# Patient Record
Sex: FEMALE | Race: WHITE
[De-identification: names, ages, dates, MRNs, and addresses within clinical notes are randomized per-mention and may not be internally consistent; named-entity substitution may affect disease eponyms.]

---

## 2020-10-23 LAB
ALBUMIN SERPL BCP-MCNC: 4.1 G/DL (ref 3.4–5)
ALBUMIN/GLOB SERPL: 1.1 {RATIO} (ref 1.1–1.5)
ALP SERPL-CCNC: 69 IU/L (ref 46–116)
ALT SERPL W P-5'-P-CCNC: 30 U/L (ref 30–65)
ANION GAP SERPL CALCULATED.3IONS-SCNC: 10 MMOL/L (ref 8–16)
APTT PPP: 27 SECONDS (ref 22–32)
AST SERPL W P-5'-P-CCNC: 30 U/L (ref 10–37)
BASOPHILS # BLD AUTO: 0.1 X10'3 (ref 0–0.2)
BASOPHILS NFR BLD AUTO: 1.8 % (ref 0–1)
BILIRUB SERPL-MCNC: 0.5 MG/DL (ref 0–1)
BUN SERPL-MCNC: 24 MG/DL (ref 7–18)
BUN/CREAT SERPL: 21.6 (ref 6.6–38)
CALCIUM SERPL-MCNC: 10.9 MG/DL (ref 8.5–10.1)
CHLORIDE SERPL-SCNC: 105 MMOL/L (ref 99–107)
CO2 SERPL-SCNC: 28 MMOL/L (ref 24–32)
CREAT SERPL-MCNC: 1.11 MG/DL (ref 0.4–0.9)
EOSINOPHIL # BLD AUTO: 0.2 X10'3 (ref 0–0.9)
EOSINOPHIL NFR BLD AUTO: 3.8 % (ref 0–6)
ERYTHROCYTE [DISTWIDTH] IN BLOOD BY AUTOMATED COUNT: 13.4 % (ref 11.5–14.5)
GFR SERPL CREATININE-BSD FRML MDRD: 48 ML/MIN
GLUCOSE SERPL-MCNC: 102 MG/DL (ref 70–104)
HCT VFR BLD AUTO: 40.5 % (ref 35–45)
HGB BLD-MCNC: 13.3 G/DL (ref 12–16)
INR PPP: 1 INR
LYMPHOCYTES # BLD AUTO: 1 X10'3 (ref 1.1–4.8)
LYMPHOCYTES NFR BLD AUTO: 20.1 % (ref 21–51)
MCH RBC QN AUTO: 29 PG (ref 27–31)
MCHC RBC AUTO-ENTMCNC: 32.9 G/DL (ref 33–36.5)
MCV RBC AUTO: 88.1 FL (ref 78–98)
MONOCYTES # BLD AUTO: 0.4 X10'3 (ref 0–0.9)
MONOCYTES NFR BLD AUTO: 8.9 % (ref 2–12)
NEUTROPHILS # BLD AUTO: 3.2 X10'3 (ref 1.8–7.7)
NEUTROPHILS NFR BLD AUTO: 65.4 % (ref 42–75)
PLATELET # BLD AUTO: 227 X10'3 (ref 140–440)
PMV BLD AUTO: 8 FL (ref 7.4–10.4)
POTASSIUM SERPL-SCNC: 3.7 MMOL/L (ref 3.4–5.1)
PROT SERPL-MCNC: 7.8 G/DL (ref 6.4–8.2)
PROTHROMBIN TIME: 10.3 SECONDS (ref 9–12)
RBC # BLD AUTO: 4.6 X10'6 (ref 4.2–5.6)
SODIUM SERPL-SCNC: 143 MMOL/L (ref 135–145)

## 2020-11-05 ENCOUNTER — HOSPITAL ENCOUNTER (OUTPATIENT)
Dept: HOSPITAL 94 - PAS | Age: 80
LOS: 3 days | Discharge: HOME | End: 2020-11-08
Attending: ORTHOPAEDIC SURGERY
Payer: MEDICARE

## 2020-11-05 VITALS — DIASTOLIC BLOOD PRESSURE: 45 MMHG | SYSTOLIC BLOOD PRESSURE: 74 MMHG

## 2020-11-05 VITALS — SYSTOLIC BLOOD PRESSURE: 89 MMHG | DIASTOLIC BLOOD PRESSURE: 44 MMHG

## 2020-11-05 VITALS — DIASTOLIC BLOOD PRESSURE: 42 MMHG | SYSTOLIC BLOOD PRESSURE: 85 MMHG

## 2020-11-05 VITALS — SYSTOLIC BLOOD PRESSURE: 84 MMHG | DIASTOLIC BLOOD PRESSURE: 44 MMHG

## 2020-11-05 VITALS — SYSTOLIC BLOOD PRESSURE: 85 MMHG | DIASTOLIC BLOOD PRESSURE: 48 MMHG

## 2020-11-05 VITALS — SYSTOLIC BLOOD PRESSURE: 81 MMHG | DIASTOLIC BLOOD PRESSURE: 50 MMHG

## 2020-11-05 VITALS — DIASTOLIC BLOOD PRESSURE: 48 MMHG | SYSTOLIC BLOOD PRESSURE: 78 MMHG

## 2020-11-05 VITALS — WEIGHT: 127.65 LBS | BODY MASS INDEX: 25.06 KG/M2 | HEIGHT: 60 IN

## 2020-11-05 VITALS — SYSTOLIC BLOOD PRESSURE: 95 MMHG | DIASTOLIC BLOOD PRESSURE: 48 MMHG

## 2020-11-05 VITALS — SYSTOLIC BLOOD PRESSURE: 90 MMHG | DIASTOLIC BLOOD PRESSURE: 48 MMHG

## 2020-11-05 VITALS — DIASTOLIC BLOOD PRESSURE: 51 MMHG | SYSTOLIC BLOOD PRESSURE: 94 MMHG

## 2020-11-05 VITALS — SYSTOLIC BLOOD PRESSURE: 92 MMHG | DIASTOLIC BLOOD PRESSURE: 42 MMHG

## 2020-11-05 VITALS — DIASTOLIC BLOOD PRESSURE: 49 MMHG | SYSTOLIC BLOOD PRESSURE: 92 MMHG

## 2020-11-05 VITALS — DIASTOLIC BLOOD PRESSURE: 50 MMHG | SYSTOLIC BLOOD PRESSURE: 97 MMHG

## 2020-11-05 VITALS — SYSTOLIC BLOOD PRESSURE: 92 MMHG | DIASTOLIC BLOOD PRESSURE: 49 MMHG

## 2020-11-05 VITALS — SYSTOLIC BLOOD PRESSURE: 145 MMHG | DIASTOLIC BLOOD PRESSURE: 84 MMHG

## 2020-11-05 VITALS — SYSTOLIC BLOOD PRESSURE: 85 MMHG | DIASTOLIC BLOOD PRESSURE: 45 MMHG

## 2020-11-05 VITALS — SYSTOLIC BLOOD PRESSURE: 107 MMHG | DIASTOLIC BLOOD PRESSURE: 58 MMHG

## 2020-11-05 VITALS — SYSTOLIC BLOOD PRESSURE: 76 MMHG | DIASTOLIC BLOOD PRESSURE: 47 MMHG

## 2020-11-05 VITALS — DIASTOLIC BLOOD PRESSURE: 44 MMHG | SYSTOLIC BLOOD PRESSURE: 87 MMHG

## 2020-11-05 VITALS — SYSTOLIC BLOOD PRESSURE: 89 MMHG | DIASTOLIC BLOOD PRESSURE: 49 MMHG

## 2020-11-05 VITALS — SYSTOLIC BLOOD PRESSURE: 84 MMHG | DIASTOLIC BLOOD PRESSURE: 43 MMHG

## 2020-11-05 VITALS — SYSTOLIC BLOOD PRESSURE: 80 MMHG | DIASTOLIC BLOOD PRESSURE: 40 MMHG

## 2020-11-05 VITALS — SYSTOLIC BLOOD PRESSURE: 101 MMHG | DIASTOLIC BLOOD PRESSURE: 46 MMHG

## 2020-11-05 VITALS — SYSTOLIC BLOOD PRESSURE: 76 MMHG | DIASTOLIC BLOOD PRESSURE: 40 MMHG

## 2020-11-05 VITALS — DIASTOLIC BLOOD PRESSURE: 52 MMHG | SYSTOLIC BLOOD PRESSURE: 88 MMHG

## 2020-11-05 VITALS — SYSTOLIC BLOOD PRESSURE: 101 MMHG | DIASTOLIC BLOOD PRESSURE: 51 MMHG

## 2020-11-05 VITALS — SYSTOLIC BLOOD PRESSURE: 90 MMHG | DIASTOLIC BLOOD PRESSURE: 51 MMHG

## 2020-11-05 VITALS — SYSTOLIC BLOOD PRESSURE: 96 MMHG | DIASTOLIC BLOOD PRESSURE: 53 MMHG

## 2020-11-05 VITALS — DIASTOLIC BLOOD PRESSURE: 53 MMHG | SYSTOLIC BLOOD PRESSURE: 101 MMHG

## 2020-11-05 VITALS — DIASTOLIC BLOOD PRESSURE: 43 MMHG | SYSTOLIC BLOOD PRESSURE: 72 MMHG

## 2020-11-05 VITALS — DIASTOLIC BLOOD PRESSURE: 49 MMHG | SYSTOLIC BLOOD PRESSURE: 89 MMHG

## 2020-11-05 VITALS — DIASTOLIC BLOOD PRESSURE: 42 MMHG | SYSTOLIC BLOOD PRESSURE: 92 MMHG

## 2020-11-05 VITALS — SYSTOLIC BLOOD PRESSURE: 85 MMHG | DIASTOLIC BLOOD PRESSURE: 46 MMHG

## 2020-11-05 VITALS — DIASTOLIC BLOOD PRESSURE: 84 MMHG | SYSTOLIC BLOOD PRESSURE: 145 MMHG

## 2020-11-05 VITALS — SYSTOLIC BLOOD PRESSURE: 79 MMHG | DIASTOLIC BLOOD PRESSURE: 43 MMHG

## 2020-11-05 VITALS — SYSTOLIC BLOOD PRESSURE: 82 MMHG | DIASTOLIC BLOOD PRESSURE: 47 MMHG

## 2020-11-05 DIAGNOSIS — Z79.899: ICD-10-CM

## 2020-11-05 DIAGNOSIS — M17.11: Primary | ICD-10-CM

## 2020-11-05 DIAGNOSIS — Z79.01: ICD-10-CM

## 2020-11-05 DIAGNOSIS — Z91.013: ICD-10-CM

## 2020-11-05 DIAGNOSIS — Z20.828: ICD-10-CM

## 2020-11-05 DIAGNOSIS — I10: ICD-10-CM

## 2020-11-05 DIAGNOSIS — G89.18: ICD-10-CM

## 2020-11-05 DIAGNOSIS — E03.9: ICD-10-CM

## 2020-11-05 LAB
ANION GAP BLD CALC-SCNC: 10 MMOL/L (ref 8–12)
BUN BLD-MCNC: 21 MG/DL (ref 6–19)
BUN/CREAT BLD: 23.3 (ref 6.6–38)
CA-I BLD-SCNC: 1.35 MMOL/L (ref 1.03–1.32)
CHLORIDE BLD-SCNC: 105 MMOL/L (ref 99–107)
CK SERPL-CCNC: 121 U/L (ref 26–192)
CO2 BLD-SCNC: 26 MMOL/L (ref 24–32)
CREAT BLD-MCNC: 0.9 MG/DL (ref 0.6–1.1)
GFR SERPL CREATININE-BSD FRML MDRD: 61 ML/MIN
GLUCOSE SERPLBLD-MCNC: 89 MG/DL (ref 70–104)
HCT VFR BLD CALC: 29 %PCV (ref 35–48)
HGB BLD CALC-MCNC: 9.9 G/DL (ref 12–16)
POTASSIUM BLD-SCNC: 4.2 MMOL/L (ref 3.5–5.1)
SODIUM BLD-SCNC: 141 MMOL/L (ref 135–145)

## 2020-11-05 PROCEDURE — 82550 ASSAY OF CK (CPK): CPT

## 2020-11-05 PROCEDURE — 86920 COMPATIBILITY TEST SPIN: CPT

## 2020-11-05 PROCEDURE — 85610 PROTHROMBIN TIME: CPT

## 2020-11-05 PROCEDURE — 97116 GAIT TRAINING THERAPY: CPT

## 2020-11-05 PROCEDURE — 27447 TOTAL KNEE ARTHROPLASTY: CPT

## 2020-11-05 PROCEDURE — 85730 THROMBOPLASTIN TIME PARTIAL: CPT

## 2020-11-05 PROCEDURE — 84443 ASSAY THYROID STIM HORMONE: CPT

## 2020-11-05 PROCEDURE — 97110 THERAPEUTIC EXERCISES: CPT

## 2020-11-05 PROCEDURE — 76942 ECHO GUIDE FOR BIOPSY: CPT

## 2020-11-05 PROCEDURE — 64448 NJX AA&/STRD FEM NRV NFS IMG: CPT

## 2020-11-05 PROCEDURE — 80047 BASIC METABLC PNL IONIZED CA: CPT

## 2020-11-05 PROCEDURE — 76937 US GUIDE VASCULAR ACCESS: CPT

## 2020-11-05 PROCEDURE — 93005 ELECTROCARDIOGRAM TRACING: CPT

## 2020-11-05 PROCEDURE — 86901 BLOOD TYPING SEROLOGIC RH(D): CPT

## 2020-11-05 PROCEDURE — 82948 REAGENT STRIP/BLOOD GLUCOSE: CPT

## 2020-11-05 PROCEDURE — 36415 COLL VENOUS BLD VENIPUNCTURE: CPT

## 2020-11-05 PROCEDURE — 86900 BLOOD TYPING SEROLOGIC ABO: CPT

## 2020-11-05 PROCEDURE — 86885 COOMBS TEST INDIRECT QUAL: CPT

## 2020-11-05 PROCEDURE — 86945 BLOOD PRODUCT/IRRADIATION: CPT

## 2020-11-05 PROCEDURE — 85025 COMPLETE CBC W/AUTO DIFF WBC: CPT

## 2020-11-05 PROCEDURE — 87081 CULTURE SCREEN ONLY: CPT

## 2020-11-05 PROCEDURE — 80053 COMPREHEN METABOLIC PANEL: CPT

## 2020-11-05 PROCEDURE — 86644 CMV ANTIBODY: CPT

## 2020-11-05 PROCEDURE — 87635 SARS-COV-2 COVID-19 AMP PRB: CPT

## 2020-11-05 RX ADMIN — Medication SCH MLS/HR: at 18:46

## 2020-11-05 RX ADMIN — SODIUM CHLORIDE AND POTASSIUM CHLORIDE SCH MLS/HR: .9; .15 SOLUTION INTRAVENOUS at 23:45

## 2020-11-05 RX ADMIN — ROPIVACAINE HYDROCHLORIDE SCH MLS/HR: 2 INJECTION, SOLUTION EPIDURAL; INFILTRATION; PERINEURAL at 10:32

## 2020-11-05 RX ADMIN — SODIUM CHLORIDE, SODIUM LACTATE, POTASSIUM CHLORIDE, AND CALCIUM CHLORIDE SCH MLS/HR: .6; .31; .03; .02 INJECTION, SOLUTION INTRAVENOUS at 06:28

## 2020-11-05 RX ADMIN — SODIUM CHLORIDE, SODIUM LACTATE, POTASSIUM CHLORIDE, AND CALCIUM CHLORIDE SCH MLS/HR: .6; .31; .03; .02 INJECTION, SOLUTION INTRAVENOUS at 18:10

## 2020-11-05 RX ADMIN — SODIUM CHLORIDE AND POTASSIUM CHLORIDE SCH MLS/HR: .9; .15 SOLUTION INTRAVENOUS at 19:07

## 2020-11-05 NOTE — NUR
RECEIVED FROM [facility] VIA BED WITH OHTF ACCOMPANIED BY ANESTHESIOLOGIST DR CARDONA, REPORT 
GIVEN. PT AWAKE AND ALERT, DENIES PAIN. SPINAL SENSATION AT MID THIGH, ADDUCTOR CANAL BLOCK 
CATHETER IN PLACE. 20 GAUGE PIV R FA PATENT AND RUNNING LR  ML/HR.F/C DRAINING CLEAR 
YELLOW URINE. PPULSES PALPABLE, BRISK CAP REFILL, SKIN PINK AND WARM, ABD SOFT,VSS. DRESSING 
R KNEE CDI WITH KNEE WRAP AND POWDER PACK IN PLACE. SCDS APPLIED AND PT RESTING COMFORTABLY.

## 2020-11-05 NOTE — NUR
TRANSFERRED VIA BED WITH OHTF, REPORT GIVEN. PT AWAKE AND ALERT, DENIES PAIN. PT ABLE TO 
WIGGLE FEET, NIETO,, ADDUCTOR CANAL BLOCK CATHETER IN PLACE WITH ONQ PUMP ON. 20 GAUGE PIV R 
FA PATENT AND RUNNING LR  ML/HR.F/C DRAINING CLEAR YELLOW URINE. PPULSES PALPABLE, 
BRISK CAP REFILL, SKIN PINK AND WARM, ABD SOFT,VSS. DRESSING R KNEE CDI WITH KNEE WRAP AND 
POWDER PACK IN PLACE. SCDS APPLIED AND PT RESTING COMFORTABLY. TOLERATING FLUIDS, TRANSFER 
APPROVED BY DR CARDONA.

## 2020-11-05 NOTE — NUR
Problems reprioritized. Patient report given, questions answered & plan of care reviewed 
with MILAN HEREDIA.

## 2020-11-06 VITALS — DIASTOLIC BLOOD PRESSURE: 49 MMHG | SYSTOLIC BLOOD PRESSURE: 105 MMHG

## 2020-11-06 VITALS — SYSTOLIC BLOOD PRESSURE: 111 MMHG | DIASTOLIC BLOOD PRESSURE: 53 MMHG

## 2020-11-06 VITALS — DIASTOLIC BLOOD PRESSURE: 52 MMHG | SYSTOLIC BLOOD PRESSURE: 103 MMHG

## 2020-11-06 VITALS — SYSTOLIC BLOOD PRESSURE: 127 MMHG | DIASTOLIC BLOOD PRESSURE: 50 MMHG

## 2020-11-06 VITALS — DIASTOLIC BLOOD PRESSURE: 55 MMHG | SYSTOLIC BLOOD PRESSURE: 118 MMHG

## 2020-11-06 VITALS — DIASTOLIC BLOOD PRESSURE: 39 MMHG | SYSTOLIC BLOOD PRESSURE: 81 MMHG

## 2020-11-06 VITALS — DIASTOLIC BLOOD PRESSURE: 52 MMHG | SYSTOLIC BLOOD PRESSURE: 115 MMHG

## 2020-11-06 LAB
ALBUMIN SERPL BCP-MCNC: 3 G/DL (ref 3.4–5)
ALBUMIN/GLOB SERPL: 1.1 {RATIO} (ref 1.1–1.5)
ALP SERPL-CCNC: 48 IU/L (ref 46–116)
ALT SERPL W P-5'-P-CCNC: 23 U/L (ref 12–78)
ANION GAP SERPL CALCULATED.3IONS-SCNC: 5 MMOL/L (ref 8–16)
AST SERPL W P-5'-P-CCNC: 19 U/L (ref 10–37)
BASOPHILS # BLD AUTO: 0 X10'3 (ref 0–0.2)
BASOPHILS NFR BLD AUTO: 0.6 % (ref 0–1)
BILIRUB SERPL-MCNC: 0.4 MG/DL (ref 0.1–1)
BUN SERPL-MCNC: 23 MG/DL (ref 7–18)
BUN/CREAT SERPL: 23.5 (ref 6.6–38)
CALCIUM SERPL-MCNC: 8.7 MG/DL (ref 8.5–10.1)
CHLORIDE SERPL-SCNC: 107 MMOL/L (ref 99–107)
CO2 SERPL-SCNC: 27.2 MMOL/L (ref 24–32)
CREAT SERPL-MCNC: 0.98 MG/DL (ref 0.4–0.9)
EOSINOPHIL # BLD AUTO: 0 X10'3 (ref 0–0.9)
EOSINOPHIL NFR BLD AUTO: 0.6 % (ref 0–6)
ERYTHROCYTE [DISTWIDTH] IN BLOOD BY AUTOMATED COUNT: 13 % (ref 11.5–14.5)
GFR SERPL CREATININE-BSD FRML MDRD: 55 ML/MIN
GLUCOSE SERPL-MCNC: 111 MG/DL (ref 70–104)
HCT VFR BLD AUTO: 30 % (ref 35–45)
HGB BLD-MCNC: 10.1 G/DL (ref 12–16)
LYMPHOCYTES # BLD AUTO: 0.2 X10'3 (ref 1.1–4.8)
LYMPHOCYTES NFR BLD AUTO: 3.8 % (ref 21–51)
MCH RBC QN AUTO: 29.5 PG (ref 27–31)
MCHC RBC AUTO-ENTMCNC: 33.6 G/DL (ref 33–36.5)
MCV RBC AUTO: 87.8 FL (ref 78–98)
MONOCYTES # BLD AUTO: 0.6 X10'3 (ref 0–0.9)
MONOCYTES NFR BLD AUTO: 9.4 % (ref 2–12)
NEUTROPHILS # BLD AUTO: 5.6 X10'3 (ref 1.8–7.7)
NEUTROPHILS NFR BLD AUTO: 85.6 % (ref 42–75)
PLATELET # BLD AUTO: 162 X10'3 (ref 140–440)
PMV BLD AUTO: 8.5 FL (ref 7.4–10.4)
POTASSIUM SERPL-SCNC: 4.4 MMOL/L (ref 3.5–5.1)
PROT SERPL-MCNC: 5.8 G/DL (ref 6.4–8.2)
RBC # BLD AUTO: 3.42 X10'6 (ref 4.2–5.6)
SODIUM SERPL-SCNC: 139 MMOL/L (ref 135–145)
WBC # BLD AUTO: 6.5 X10'3 (ref 4.5–11)

## 2020-11-06 RX ADMIN — Medication SCH MLS/HR: at 00:44

## 2020-11-06 RX ADMIN — HYDROCODONE BITARTRATE AND ACETAMINOPHEN PRN TAB: 10; 325 TABLET ORAL at 11:58

## 2020-11-06 RX ADMIN — SODIUM CHLORIDE AND POTASSIUM CHLORIDE SCH MLS/HR: .9; .15 SOLUTION INTRAVENOUS at 23:40

## 2020-11-06 RX ADMIN — SODIUM CHLORIDE AND POTASSIUM CHLORIDE SCH MLS/HR: .9; .15 SOLUTION INTRAVENOUS at 10:31

## 2020-11-06 RX ADMIN — HYDROCODONE BITARTRATE AND ACETAMINOPHEN PRN TAB: 10; 325 TABLET ORAL at 05:32

## 2020-11-06 RX ADMIN — HYDROCODONE BITARTRATE AND ACETAMINOPHEN PRN TAB: 10; 325 TABLET ORAL at 20:18

## 2020-11-06 RX ADMIN — LEVOTHYROXINE SODIUM SCH MCG: 75 TABLET ORAL at 07:23

## 2020-11-06 NOTE — NUR
Problems reprioritized. Patient report given, questions answered & plan of care reviewed 
with Lala YATES.

## 2020-11-06 NOTE — NUR
Patient in room [facility]TH[facility] 4024. I have received report from  Lala YATES  and had the opportunity to 
ask questions and assume patient care.

## 2020-11-06 NOTE — NUR
I spoke to Dr. Alaniz about paitent BP 81/39. He said okay to bolus patient with 250ml, 
then if tolerated well by patient to bolus another 250ml's of NS

## 2020-11-06 NOTE — NUR
Patient in room [facility]TH[facility] 4024. I have received report from cookie white   and had the opportunity 
to ask questions and assume patient care.

## 2020-11-07 VITALS — SYSTOLIC BLOOD PRESSURE: 142 MMHG | DIASTOLIC BLOOD PRESSURE: 68 MMHG

## 2020-11-07 VITALS — DIASTOLIC BLOOD PRESSURE: 64 MMHG | SYSTOLIC BLOOD PRESSURE: 138 MMHG

## 2020-11-07 VITALS — DIASTOLIC BLOOD PRESSURE: 79 MMHG | SYSTOLIC BLOOD PRESSURE: 164 MMHG

## 2020-11-07 VITALS — DIASTOLIC BLOOD PRESSURE: 51 MMHG | SYSTOLIC BLOOD PRESSURE: 109 MMHG

## 2020-11-07 LAB
ALBUMIN SERPL BCP-MCNC: 2.8 G/DL (ref 3.4–5)
ALBUMIN/GLOB SERPL: 1 {RATIO} (ref 1.1–1.5)
ALP SERPL-CCNC: 56 IU/L (ref 46–116)
ALT SERPL W P-5'-P-CCNC: 31 U/L (ref 12–78)
ANION GAP SERPL CALCULATED.3IONS-SCNC: 6 MMOL/L (ref 8–16)
AST SERPL W P-5'-P-CCNC: 34 U/L (ref 10–37)
BASOPHILS # BLD AUTO: 0 X10'3 (ref 0–0.2)
BASOPHILS NFR BLD AUTO: 0.3 % (ref 0–1)
BILIRUB SERPL-MCNC: 0.4 MG/DL (ref 0.1–1)
BUN SERPL-MCNC: 28 MG/DL (ref 7–18)
BUN/CREAT SERPL: 25.7 (ref 6.6–38)
CALCIUM SERPL-MCNC: 8.9 MG/DL (ref 8.5–10.1)
CHLORIDE SERPL-SCNC: 107 MMOL/L (ref 99–107)
CO2 SERPL-SCNC: 25 MMOL/L (ref 24–32)
CREAT SERPL-MCNC: 1.09 MG/DL (ref 0.4–0.9)
EOSINOPHIL # BLD AUTO: 0.1 X10'3 (ref 0–0.9)
EOSINOPHIL NFR BLD AUTO: 1 % (ref 0–6)
ERYTHROCYTE [DISTWIDTH] IN BLOOD BY AUTOMATED COUNT: 13.2 % (ref 11.5–14.5)
GFR SERPL CREATININE-BSD FRML MDRD: 49 ML/MIN
GLUCOSE SERPL-MCNC: 111 MG/DL (ref 70–104)
HCT VFR BLD AUTO: 27.7 % (ref 35–45)
HGB BLD-MCNC: 9.3 G/DL (ref 12–16)
LYMPHOCYTES # BLD AUTO: 0.4 X10'3 (ref 1.1–4.8)
LYMPHOCYTES NFR BLD AUTO: 5.2 % (ref 21–51)
MCH RBC QN AUTO: 29.5 PG (ref 27–31)
MCHC RBC AUTO-ENTMCNC: 33.6 G/DL (ref 33–36.5)
MCV RBC AUTO: 87.8 FL (ref 78–98)
MONOCYTES # BLD AUTO: 0.9 X10'3 (ref 0–0.9)
MONOCYTES NFR BLD AUTO: 10.2 % (ref 2–12)
NEUTROPHILS # BLD AUTO: 7 X10'3 (ref 1.8–7.7)
NEUTROPHILS NFR BLD AUTO: 83.3 % (ref 42–75)
PLATELET # BLD AUTO: 143 X10'3 (ref 140–440)
PMV BLD AUTO: 8.8 FL (ref 7.4–10.4)
POTASSIUM SERPL-SCNC: 4.7 MMOL/L (ref 3.5–5.1)
PROT SERPL-MCNC: 5.7 G/DL (ref 6.4–8.2)
RBC # BLD AUTO: 3.15 X10'6 (ref 4.2–5.6)
SODIUM SERPL-SCNC: 138 MMOL/L (ref 135–145)
WBC # BLD AUTO: 8.5 X10'3 (ref 4.5–11)

## 2020-11-07 RX ADMIN — LEVOTHYROXINE SODIUM SCH MCG: 75 TABLET ORAL at 12:10

## 2020-11-07 RX ADMIN — HYDROCODONE BITARTRATE AND ACETAMINOPHEN PRN TAB: 10; 325 TABLET ORAL at 12:25

## 2020-11-07 RX ADMIN — HYDROCODONE BITARTRATE AND ACETAMINOPHEN PRN TAB: 10; 325 TABLET ORAL at 05:44

## 2020-11-07 RX ADMIN — HYDROCODONE BITARTRATE AND ACETAMINOPHEN PRN TAB: 10; 325 TABLET ORAL at 00:42

## 2020-11-07 RX ADMIN — ROPIVACAINE HYDROCHLORIDE SCH MLS/HR: 2 INJECTION, SOLUTION EPIDURAL; INFILTRATION; PERINEURAL at 09:06

## 2020-11-07 NOTE — NUR
Student documentation:

I have reviewed and agree with all interventions, assessments performed and documented by 
Sheldon Jin.

## 2020-11-07 NOTE — NUR
Problems reprioritized. Patient report given, questions answered & plan of care reviewed 
with Dinora YATES.

## 2020-11-08 VITALS — SYSTOLIC BLOOD PRESSURE: 140 MMHG

## 2020-11-08 VITALS — SYSTOLIC BLOOD PRESSURE: 140 MMHG | DIASTOLIC BLOOD PRESSURE: 74 MMHG

## 2020-11-08 LAB
ALBUMIN SERPL BCP-MCNC: 2.4 G/DL (ref 3.4–5)
ALBUMIN/GLOB SERPL: 0.8 {RATIO} (ref 1.1–1.5)
ALP SERPL-CCNC: 63 IU/L (ref 46–116)
ALT SERPL W P-5'-P-CCNC: 35 U/L (ref 12–78)
ANION GAP SERPL CALCULATED.3IONS-SCNC: 5 MMOL/L (ref 8–16)
AST SERPL W P-5'-P-CCNC: 32 U/L (ref 10–37)
BASOPHILS # BLD AUTO: 0 X10'3 (ref 0–0.2)
BASOPHILS NFR BLD AUTO: 0.5 % (ref 0–1)
BILIRUB SERPL-MCNC: 0.5 MG/DL (ref 0.1–1)
BUN SERPL-MCNC: 15 MG/DL (ref 7–18)
BUN/CREAT SERPL: 20 (ref 6.6–38)
CALCIUM SERPL-MCNC: 8.5 MG/DL (ref 8.5–10.1)
CHLORIDE SERPL-SCNC: 108 MMOL/L (ref 99–107)
CO2 SERPL-SCNC: 25.7 MMOL/L (ref 24–32)
CREAT SERPL-MCNC: 0.75 MG/DL (ref 0.4–0.9)
EOSINOPHIL # BLD AUTO: 0.2 X10'3 (ref 0–0.9)
EOSINOPHIL NFR BLD AUTO: 3.7 % (ref 0–6)
ERYTHROCYTE [DISTWIDTH] IN BLOOD BY AUTOMATED COUNT: 13 % (ref 11.5–14.5)
GFR SERPL CREATININE-BSD FRML MDRD: 75 ML/MIN
GLUCOSE SERPL-MCNC: 96 MG/DL (ref 70–104)
HCT VFR BLD AUTO: 25.8 % (ref 35–45)
HGB BLD-MCNC: 8.7 G/DL (ref 12–16)
LYMPHOCYTES # BLD AUTO: 0.4 X10'3 (ref 1.1–4.8)
LYMPHOCYTES NFR BLD AUTO: 7.3 % (ref 21–51)
MCH RBC QN AUTO: 29.5 PG (ref 27–31)
MCHC RBC AUTO-ENTMCNC: 33.7 G/DL (ref 33–36.5)
MCV RBC AUTO: 87.3 FL (ref 78–98)
MONOCYTES # BLD AUTO: 0.6 X10'3 (ref 0–0.9)
MONOCYTES NFR BLD AUTO: 10.4 % (ref 2–12)
NEUTROPHILS # BLD AUTO: 4.4 X10'3 (ref 1.8–7.7)
NEUTROPHILS NFR BLD AUTO: 78.1 % (ref 42–75)
PLATELET # BLD AUTO: 133 X10'3 (ref 140–440)
PMV BLD AUTO: 8.5 FL (ref 7.4–10.4)
POTASSIUM SERPL-SCNC: 4.1 MMOL/L (ref 3.5–5.1)
PROT SERPL-MCNC: 5.5 G/DL (ref 6.4–8.2)
RBC # BLD AUTO: 2.95 X10'6 (ref 4.2–5.6)
SODIUM SERPL-SCNC: 139 MMOL/L (ref 135–145)
WBC # BLD AUTO: 5.7 X10'3 (ref 4.5–11)

## 2020-11-08 RX ADMIN — LEVOTHYROXINE SODIUM SCH MCG: 75 TABLET ORAL at 07:36

## 2020-11-08 RX ADMIN — HYDROCODONE BITARTRATE AND ACETAMINOPHEN PRN TAB: 10; 325 TABLET ORAL at 09:33

## 2020-11-08 RX ADMIN — HYDROCODONE BITARTRATE AND ACETAMINOPHEN PRN TAB: 10; 325 TABLET ORAL at 04:28

## 2020-11-08 NOTE — NUR
Problems reprioritized. Patient report given, questions answered & plan of care reviewed 
with Monique YATES.

## 2020-11-08 NOTE — NUR
pt d/c with instructions, understanding of instructions and w/all belongings including 
powders packs and an extra chary dressing, in wheelchair accompanied by nursing staff to 
private vehicle to go home and f/u w/surgeon

## 2023-10-09 NOTE — NUR
Patient in room ORTH[facility] 4024. I have received report from Dinora YATES and had the opportunity to 
ask questions and assume patient care. Rinvoq Pregnancy And Lactation Text: Based on animal studies, Rinvoq may cause embryo-fetal harm when administered to pregnant women.  The medication should not be used in pregnancy.  Breastfeeding is not recommended during treatment and for 6 days after the last dose.